# Patient Record
Sex: FEMALE | Race: WHITE | Employment: FULL TIME | URBAN - NONMETROPOLITAN AREA
[De-identification: names, ages, dates, MRNs, and addresses within clinical notes are randomized per-mention and may not be internally consistent; named-entity substitution may affect disease eponyms.]

---

## 2022-05-04 ENCOUNTER — APPOINTMENT (OUTPATIENT)
Dept: GENERAL RADIOLOGY | Age: 52
End: 2022-05-04
Payer: MEDICARE

## 2022-05-04 ENCOUNTER — HOSPITAL ENCOUNTER (EMERGENCY)
Age: 52
Discharge: HOME | End: 2022-05-04
Payer: MEDICARE

## 2022-05-04 VITALS
DIASTOLIC BLOOD PRESSURE: 87 MMHG | SYSTOLIC BLOOD PRESSURE: 148 MMHG | OXYGEN SATURATION: 97 % | WEIGHT: 140 LBS | TEMPERATURE: 98.7 F | HEART RATE: 63 BPM | RESPIRATION RATE: 12 BRPM

## 2022-05-04 DIAGNOSIS — G43.009 MIGRAINE WITHOUT AURA AND WITHOUT STATUS MIGRAINOSUS, NOT INTRACTABLE: ICD-10-CM

## 2022-05-04 DIAGNOSIS — R07.89 CHEST WALL PAIN: Primary | ICD-10-CM

## 2022-05-04 LAB
ABSOLUTE EOS #: 0.11 K/UL (ref 0–0.44)
ABSOLUTE IMMATURE GRANULOCYTE: <0.03 K/UL (ref 0–0.3)
ABSOLUTE LYMPH #: 1.85 K/UL (ref 1.1–3.7)
ABSOLUTE MONO #: 0.65 K/UL (ref 0.1–1.2)
ALBUMIN SERPL-MCNC: 4.1 G/DL (ref 3.5–5.2)
ALBUMIN/GLOBULIN RATIO: 1.6 (ref 1–2.5)
ALP BLD-CCNC: 51 U/L (ref 35–104)
ALT SERPL-CCNC: <5 U/L (ref 5–33)
ANION GAP SERPL CALCULATED.3IONS-SCNC: 9 MMOL/L (ref 9–17)
AST SERPL-CCNC: 16 U/L
BASOPHILS # BLD: 0 % (ref 0–2)
BASOPHILS ABSOLUTE: <0.03 K/UL (ref 0–0.2)
BILIRUB SERPL-MCNC: 0.26 MG/DL (ref 0.3–1.2)
BUN BLDV-MCNC: 10 MG/DL (ref 6–20)
BUN/CREAT BLD: 14 (ref 9–20)
CALCIUM SERPL-MCNC: 8.6 MG/DL (ref 8.6–10.4)
CHLORIDE BLD-SCNC: 108 MMOL/L (ref 98–107)
CO2: 22 MMOL/L (ref 20–31)
CREAT SERPL-MCNC: 0.73 MG/DL (ref 0.5–0.9)
EOSINOPHILS RELATIVE PERCENT: 2 % (ref 1–4)
GFR AFRICAN AMERICAN: >60 ML/MIN
GFR NON-AFRICAN AMERICAN: >60 ML/MIN
GFR SERPL CREATININE-BSD FRML MDRD: ABNORMAL ML/MIN/{1.73_M2}
GFR SERPL CREATININE-BSD FRML MDRD: ABNORMAL ML/MIN/{1.73_M2}
GLUCOSE BLD-MCNC: 91 MG/DL (ref 70–99)
HCT VFR BLD CALC: 36.5 % (ref 36.3–47.1)
HEMOGLOBIN: 12 G/DL (ref 11.9–15.1)
IMMATURE GRANULOCYTES: 0 %
LYMPHOCYTES # BLD: 35 % (ref 24–43)
MCH RBC QN AUTO: 28.2 PG (ref 25.2–33.5)
MCHC RBC AUTO-ENTMCNC: 32.9 G/DL (ref 28.4–34.8)
MCV RBC AUTO: 85.9 FL (ref 82.6–102.9)
MONOCYTES # BLD: 12 % (ref 3–12)
NRBC AUTOMATED: 0 PER 100 WBC
PDW BLD-RTO: 14.3 % (ref 11.8–14.4)
PLATELET # BLD: 371 K/UL (ref 138–453)
PMV BLD AUTO: 9.1 FL (ref 8.1–13.5)
POTASSIUM SERPL-SCNC: 4.1 MMOL/L (ref 3.7–5.3)
RBC # BLD: 4.25 M/UL (ref 3.95–5.11)
SEG NEUTROPHILS: 51 % (ref 36–65)
SEGMENTED NEUTROPHILS ABSOLUTE COUNT: 2.66 K/UL (ref 1.5–8.1)
SODIUM BLD-SCNC: 139 MMOL/L (ref 135–144)
TOTAL PROTEIN: 6.6 G/DL (ref 6.4–8.3)
TROPONIN, HIGH SENSITIVITY: <6 NG/L (ref 0–14)
TROPONIN, HIGH SENSITIVITY: <6 NG/L (ref 0–14)
WBC # BLD: 5.3 K/UL (ref 3.5–11.3)

## 2022-05-04 PROCEDURE — 80053 COMPREHEN METABOLIC PANEL: CPT

## 2022-05-04 PROCEDURE — 99285 EMERGENCY DEPT VISIT HI MDM: CPT

## 2022-05-04 PROCEDURE — 93005 ELECTROCARDIOGRAM TRACING: CPT | Performed by: STUDENT IN AN ORGANIZED HEALTH CARE EDUCATION/TRAINING PROGRAM

## 2022-05-04 PROCEDURE — 96374 THER/PROPH/DIAG INJ IV PUSH: CPT

## 2022-05-04 PROCEDURE — 84484 ASSAY OF TROPONIN QUANT: CPT

## 2022-05-04 PROCEDURE — 96375 TX/PRO/DX INJ NEW DRUG ADDON: CPT

## 2022-05-04 PROCEDURE — 85025 COMPLETE CBC W/AUTO DIFF WBC: CPT

## 2022-05-04 PROCEDURE — 71045 X-RAY EXAM CHEST 1 VIEW: CPT

## 2022-05-04 PROCEDURE — 6360000002 HC RX W HCPCS: Performed by: PHYSICIAN ASSISTANT

## 2022-05-04 PROCEDURE — 36415 COLL VENOUS BLD VENIPUNCTURE: CPT

## 2022-05-04 PROCEDURE — 6370000000 HC RX 637 (ALT 250 FOR IP): Performed by: PHYSICIAN ASSISTANT

## 2022-05-04 PROCEDURE — 96372 THER/PROPH/DIAG INJ SC/IM: CPT

## 2022-05-04 RX ORDER — AMLODIPINE BESYLATE 2.5 MG/1
TABLET ORAL
COMMUNITY
Start: 2022-03-29

## 2022-05-04 RX ORDER — SUMATRIPTAN 6 MG/.5ML
6 INJECTION, SOLUTION SUBCUTANEOUS ONCE
Status: COMPLETED | OUTPATIENT
Start: 2022-05-04 | End: 2022-05-04

## 2022-05-04 RX ORDER — ASPIRIN 325 MG
325 TABLET ORAL ONCE
Status: COMPLETED | OUTPATIENT
Start: 2022-05-04 | End: 2022-05-04

## 2022-05-04 RX ORDER — TEMAZEPAM 15 MG/1
CAPSULE ORAL
COMMUNITY
Start: 2022-03-18

## 2022-05-04 RX ORDER — HYDROCHLOROTHIAZIDE 25 MG/1
TABLET ORAL
COMMUNITY
Start: 2022-02-14

## 2022-05-04 RX ORDER — ESCITALOPRAM OXALATE 20 MG/1
20 TABLET ORAL DAILY
COMMUNITY
Start: 2022-03-06

## 2022-05-04 RX ORDER — HYDROXYZINE HYDROCHLORIDE 25 MG/1
TABLET, FILM COATED ORAL
COMMUNITY
Start: 2022-03-29

## 2022-05-04 RX ORDER — KETOROLAC TROMETHAMINE 15 MG/ML
30 INJECTION, SOLUTION INTRAMUSCULAR; INTRAVENOUS ONCE
Status: COMPLETED | OUTPATIENT
Start: 2022-05-04 | End: 2022-05-04

## 2022-05-04 RX ORDER — ONDANSETRON 2 MG/ML
4 INJECTION INTRAMUSCULAR; INTRAVENOUS ONCE
Status: COMPLETED | OUTPATIENT
Start: 2022-05-04 | End: 2022-05-04

## 2022-05-04 RX ORDER — PRAZOSIN HYDROCHLORIDE 1 MG/1
CAPSULE ORAL
COMMUNITY
Start: 2022-03-10

## 2022-05-04 RX ORDER — LORATADINE 10 MG/1
10 TABLET ORAL DAILY
COMMUNITY

## 2022-05-04 RX ORDER — TRAZODONE HYDROCHLORIDE 100 MG/1
TABLET ORAL
COMMUNITY
Start: 2022-03-10

## 2022-05-04 RX ORDER — MIRTAZAPINE 7.5 MG/1
TABLET, FILM COATED ORAL
COMMUNITY
Start: 2022-02-14

## 2022-05-04 RX ADMIN — KETOROLAC TROMETHAMINE 30 MG: 15 INJECTION, SOLUTION INTRAMUSCULAR; INTRAVENOUS at 17:09

## 2022-05-04 RX ADMIN — SUMATRIPTAN 6 MG: 6 INJECTION, SOLUTION SUBCUTANEOUS at 19:13

## 2022-05-04 RX ADMIN — ASPIRIN 325 MG: 325 TABLET, FILM COATED ORAL at 17:09

## 2022-05-04 RX ADMIN — ONDANSETRON 4 MG: 2 INJECTION INTRAMUSCULAR; INTRAVENOUS at 19:10

## 2022-05-04 ASSESSMENT — ENCOUNTER SYMPTOMS
GASTROINTESTINAL NEGATIVE: 1
EYES NEGATIVE: 1
ALLERGIC/IMMUNOLOGIC NEGATIVE: 1

## 2022-05-04 ASSESSMENT — PAIN SCALES - GENERAL
PAINLEVEL_OUTOF10: 8
PAINLEVEL_OUTOF10: 4

## 2022-05-04 NOTE — Clinical Note
Mone Gilliland was seen and treated in our emergency department on 5/4/2022. She may return to work on 05/06/2022. If you have any questions or concerns, please don't hesitate to call.       Leta Cao PA-C

## 2022-05-04 NOTE — ED PROVIDER NOTES
The PA/NP has seen the patient and I have performed this visit along with the involvement of the PA/NP. I agree with the  PA/NPs findings and plan. I have performed all aspects of MDM as documented including evaluation of the patient/patients  condition(s), review and analysis of available data, and determination of risk of patient management decisions. History of migraine with headache not sudden in onset, thunderclap or whol. Left sided nonradiating chest pain reproduced with palpation, history of htn.   No recent cocaine      ED Course as of 05/04/22 1831   Wed May 04, 2022   1638 Sinus rhythm rate of 64 normal axis  MI interval 146 QRS duration is 90 ms normal R wave progression there are T wave inversions noted in the V2 no prior EKG for comparison is appropriate precordial T wave balance [BG]   1824 2 negative trops [BG]      ED Course User Index  [BG] Johanna Evans,          HEART Risk Score for Chest Pain Patients      History and Physical Exam Suspicion Level   · Nausea/Vomiting   · Diaphoresis   · Radiation   · Related to Exertion  · Quality of Pain     EKG Interpretation  · Normal (0 pts)  · Non-Specific Repolarization Disturbance (1 pt)  · Significant ST-Depression (2 pts)     Age of Patient (in years)  · = 45 (0 pts)  · 46-64 (1 pt)  · = 65 (2 pts)    Risk Factors (number present)  · Hypercholesterolemia  · Hypertension  · Diabetes Mellitus  · Cigarette smoking  · Positive family history  · Obesity  · CAD  · Automatically get 2 points for - SLE, CKDz, HIV, Cocaine abuse     Troponin Levels  · = Normal Limit (0 pts)  · 1-3 Times Normal Limit (1 pt)  · > 3 Times Normal Limit (2 pts)      H&P ECG  Patient Age Risk Factors Troponins   0   Slight Normal   45   None   Normal level   1   Moderate Non-specific    46-64   1-2 risk factors   1-3 x Normal   2   High ST Changes   65 or older   3+ risk factors   3+ x Normal   Total 0 1 1 1 0     TOTAL: 3    Percent Risk for Major Adverse Cardiac Event (MACE)  0-3 pts indicates low risk for MACE   2.5% (DISCHARGE)   4-7 pts indicates moderate risk for MACE  20.3% (OBS)  8-10 pts indicates high risk for MACE   72.7% (EARLY INVASIVE [place])          Hx:   Problem Noted Date   Severe recurrent major depression without psychotic features 02/28/2022   MDD (major depressive disorder), recurrent severe, without psychosis 07/11/2021   MDD (major depressive disorder) 07/09/2021   JONNY (generalized anxiety disorder) 07/09/2021   Cocaine abuse 07/09/2021     Patient was given Toradol and Imitrex with resolution of her headache.   Discharged in stable condition with outpatient follow-up      Cindy Marrero MS, RD   Emergency Medicine       Linda Rivers DO  Resident  05/05/22 2174

## 2022-05-04 NOTE — ED PROVIDER NOTES
[facility] ED  EMERGENCY DEPARTMENT ENCOUNTER      Pt Name: Nonah Soulier  MRN: [id number]  [place] 1970  Date of evaluation: 5/4/2022  Provider: Isaac Aparicio COMPLAINT       Chief Complaint   Patient presents with    Chest Pain     left side, onset around 2 pm while driving to work    Headache     hx of migraines         HISTORY OF PRESENT ILLNESS    Nonah Soulier is a 54 y.o. female who presents to the emergency department with complaint of left-sided chest pain worse with sitting on it. Also patient complains of headache that started this morning after waking its throbbing rated initially a 4 out of 10 and currently at 8 out of 10. Patient mitts to the migraine headaches. This feels like her typical migraine headache. Patient denies any shortness of breath or fever. Patient states she does have a history of underlying anxiety as well as hypertension. Nursing Notes were reviewed. REVIEW OF SYSTEMS    (2-9 systems for level 4, 10 or more for level 5)     Review of Systems   Constitutional: Negative. HENT: Negative. Eyes: Negative. Cardiovascular: Positive for chest pain. Gastrointestinal: Negative. Endocrine: Negative. Genitourinary: Negative. Musculoskeletal: Positive for myalgias. Skin: Negative. Allergic/Immunologic: Negative. Neurological: Positive for headaches. Hematological: Negative. Psychiatric/Behavioral: The patient is nervous/anxious. All other systems reviewed and are negative. Except as noted above the remainder of the review of systems was reviewed and negative. PAST MEDICAL HISTORY   No past medical history on file. SURGICAL HISTORY     No past surgical history on file.       CURRENT MEDICATIONS       Discharge Medication List as of 5/4/2022  7:19 PM      CONTINUE these medications which have NOT CHANGED    Details   escitalopram (LEXAPRO) 20 MG tablet Take 20 mg by mouth dailyHistorical Med amLODIPine (NORVASC) 2.5 MG tablet Historical Med      hydroCHLOROthiazide (HYDRODIURIL) 25 MG tablet Historical Med      hydrOXYzine (ATARAX) 25 MG tablet Historical Med      loratadine (CLARITIN) 10 MG tablet Take 10 mg by mouth dailyHistorical Med      mirtazapine (REMERON) 7.5 MG tablet Historical Med      prazosin (MINIPRESS) 1 MG capsule Historical Med      temazepam (RESTORIL) 15 MG capsule Historical Med      traZODone (DESYREL) 100 MG tablet Historical Med             ALLERGIES     Patient has no known allergies. FAMILY HISTORY     No family history on file. SOCIAL HISTORY       Social History     Socioeconomic History    Marital status: Single     Spouse name: Not on file    Number of children: Not on file    Years of education: Not on file    Highest education level: Not on file   Occupational History    Not on file   Tobacco Use    Smoking status: Not on file    Smokeless tobacco: Not on file   Substance and Sexual Activity    Alcohol use: Not on file    Drug use: Not on file    Sexual activity: Not on file   Other Topics Concern    Not on file   Social History Narrative    Not on file     Social Determinants of Health     Financial Resource Strain:     Difficulty of Paying Living Expenses: Not on file   Food Insecurity:     Worried About Running Out of Food in the Last Year: Not on file    Ansley of Food in the Last Year: Not on file   Transportation Needs:     Lack of Transportation (Medical): Not on file    Lack of Transportation (Non-Medical):  Not on file   Physical Activity:     Days of Exercise per Week: Not on file    Minutes of Exercise per Session: Not on file   Stress:     Feeling of Stress : Not on file   Social Connections:     Frequency of Communication with Friends and Family: Not on file    Frequency of Social Gatherings with Friends and Family: Not on file    Attends Restorationist Services: Not on file    Active Member of Clubs or Organizations: Not on file  Attends Club or Organization Meetings: Not on file    Marital Status: Not on file   Intimate Partner Violence:     Fear of Current or Ex-Partner: Not on file    Emotionally Abused: Not on file    Physically Abused: Not on file    Sexually Abused: Not on file   Housing Stability:     Unable to Pay for Housing in the Last Year: Not on file    Number of [place] in the Last Year: Not on file    Unstable Housing in the Last Year: Not on file       SCREENINGS         Jonn Coma Scale  Eye Opening: Spontaneous  Best Verbal Response: Oriented  Best Motor Response: Obeys commands  [place] Coma Scale Score: 15                     CIWA Assessment  BP: (!) 148/87  Pulse: 63                 PHYSICAL EXAM    (up to 7 for level 4, 8 or more for level 5)     ED Triage Vitals   BP Temp Temp Source Pulse Resp SpO2 Height Weight   05/04/22 1611 05/04/22 1604 05/04/22 1604 05/04/22 1611 05/04/22 1611 05/04/22 1611 -- 05/04/22 1611   (!) 154/80 98.7 °F (37.1 °C) Tympanic 70 16 98 %  140 lb (63.5 kg)       Physical Exam  Vitals and nursing note reviewed. Constitutional:       Appearance: Normal appearance. HENT:      Head: Normocephalic and atraumatic. Nose: Nose normal.      Mouth/Throat:      Mouth: Mucous membranes are dry. Eyes:      Extraocular Movements: Extraocular movements intact. Pupils: Pupils are equal, round, and reactive to light. Cardiovascular:      Rate and Rhythm: Normal rate and regular rhythm. Pulmonary:      Effort: Pulmonary effort is normal.      Breath sounds: Normal breath sounds. Abdominal:      Palpations: Abdomen is soft. Musculoskeletal:         General: Tenderness present. Cervical back: Normal range of motion and neck supple. Comments: Tenderness left superior anterior chest wall. Skin:     General: Skin is warm and dry. Capillary Refill: Capillary refill takes less than 2 seconds. Neurological:      General: No focal deficit present.       Mental Status: She is alert and oriented to person, place, and time. Psychiatric:         Mood and Affect: Mood normal.         Behavior: Behavior normal.         DIAGNOSTIC RESULTS     EKG: All EKG's are interpreted by the Emergency Department Physician who either signs or Co-signs this chart in the absence of a cardiologist.      RADIOLOGY:   Non-plain film images such as CT, Ultrasound and MRI are read by the radiologist. Plain radiographic images are visualized and preliminarily interpreted by the emergency physician with the below findings:      Interpretation per the Radiologist below, if available at the time of this note:    XR CHEST PORTABLE   Final Result   No radiographic evidence of acute cardiopulmonary disease. LABS:  Labs Reviewed   COMPREHENSIVE METABOLIC PANEL W/ REFLEX TO MG FOR LOW K - Abnormal; Notable for the following components:       Result Value    Chloride 108 (*)     ALT <5 (*)     Total Bilirubin 0.26 (*)     All other components within normal limits   CBC WITH AUTO DIFFERENTIAL   TROPONIN   TROPONIN       All other labs were within normal range or not returned as of this dictation. EMERGENCY DEPARTMENT COURSE and DIFFERENTIAL DIAGNOSIS/MDM:   Vitals:    Vitals:    05/04/22 1830 05/04/22 1845 05/04/22 1900 05/04/22 1915   BP: (!) 143/87 (!) 140/77 138/71 (!) 148/87   Pulse: 59 62 61 63   Resp: 13 13 13 12   Temp:       TempSrc:       SpO2: 97% 96% 96% 97%   Weight:             MDM  Number of Diagnoses or Management Options  Chest wall pain  Migraine without aura and without status migrainosus, not intractable  Diagnosis management comments: Patient is a 42-year-old female with left-sided chest pain that was reproducible with palpation. Serial troponins within normal range. EKG revealed no acute ST elevation. There is evidence of normal sinus rhythm with 64 bpm.  There was some T wave inversion seen in V2. X-ray revealed no acute findings as well.   Patient also complained of one of her typical migraine headaches that gradually worsened today. He was given Toradol 30 mg IV and then Imitrex 6 mg subcu with relief of her headache. Patient was neurologically intact without any focal neurological deficit. GCS of 15. Patient discharged home in stable condition. She was to discuss possible cardiac stress test in the future with primary care provider. Amount and/or Complexity of Data Reviewed  Clinical lab tests: reviewed  Tests in the radiology section of CPT®: reviewed  Tests in the medicine section of CPT®: reviewed          REASSESSMENT     ED Course as of 05/04/22 2039   Wed May 04, 2022   1638 Sinus rhythm rate of 64 normal axis  OH interval 146 QRS duration is 90 ms normal R wave progression there are T wave inversions noted in the V2 no prior EKG for comparison is appropriate precordial T wave balance [BG]   1824 2 negative trops [BG]      ED Course User Index  [BG] Johanna Evans DO         CONSULTS:  None    PROCEDURES:  Unless otherwise noted below, none     Procedures      FINAL IMPRESSION      1. Chest wall pain    2. Migraine without aura and without status migrainosus, not intractable          DISPOSITION/PLAN   DISPOSITION        PATIENT REFERRED TO:  [address] [place] [address]  [phone]    Schedule an appointment as soon as possible for a visit in 1 week        DISCHARGE MEDICATIONS:  Discharge Medication List as of 5/4/2022  7:19 PM        Controlled Substances Monitoring:     No flowsheet data found. (Please note that portions of this note were completed with a voice recognition program.  Efforts were made to edit the dictations but occasionally words are mis-transcribed. Danyell Rubin Double (electronically signed)           Vania Marcus PA-C  05/04/22 2039

## 2022-05-05 LAB
EKG ATRIAL RATE: 64 BPM
EKG P AXIS: 52 DEGREES
EKG P-R INTERVAL: 146 MS
EKG Q-T INTERVAL: 430 MS
EKG QRS DURATION: 98 MS
EKG QTC CALCULATION (BAZETT): 443 MS
EKG R AXIS: 52 DEGREES
EKG T AXIS: 53 DEGREES
EKG VENTRICULAR RATE: 64 BPM

## 2022-05-05 PROCEDURE — 93010 ELECTROCARDIOGRAM REPORT: CPT | Performed by: INTERNAL MEDICINE
